# Patient Record
(demographics unavailable — no encounter records)

---

## 2017-04-12 NOTE — EDM.PDOC
ED HPI RENAL/





- General


Chief Complaint: Genitourinary Problem


Stated Complaint: UTI symptoms


Time Seen by Provider: 04/12/17 19:08


Source of Information: Reports: Patient


History Limitations: Reports: No limitations





- History of Present Illness


INITIAL COMMENTS - FREE TEXT/NARRATIVE: 





Burning, frequency of urination present for one week. Worsening. Some lower 

abdominal discomfort. No CVA pain.


Denies fevers/chills/nausea/emesis/bowel changes. No hematuria. 


No UTI since around 2014. 


No other complaints. 


Taking old Pyridium at home for burning, not much help. 





- Related Data


Allergies/ADRs: 


 Allergies











Allergy/AdvReac Type Severity Reaction Status Date / Time


 


acetaminophen Allergy  Dizziness Verified 04/12/17 18:57





[From Darvocet-N]     


 


coconut Allergy  Cannot Verified 04/12/17 18:57





   Remember  


 


codeine Allergy  Nausea and Verified 04/12/17 18:57





   Vomiting  


 


hydrocodone bitartrate Allergy  Nausea Verified 04/12/17 18:57





[From Vicodin]     


 


oxycodone HCl [From Percocet] Allergy  Dizziness Verified 04/12/17 18:57


 


propoxyphene napsylate Allergy  Dizziness Verified 04/12/17 18:57





[From Darvocet-N]     


 


Sulfa (Sulfonamide Allergy  Nausea and Verified 04/12/17 18:57





Antibiotics)   Vomiting  


 


steriods Allergy  Cannot Uncoded 04/12/17 18:57





   Remember  











Home Meds: 


 Home Meds





Ibuprofen [Advil] 200 mg PO Q6H PRN 02/28/14 [History]


Multivitamin [Multi-Vitamin Daily] 1 each PO DAILY 04/12/17 [History]


Nitrofurantoin Monohyd/M-Cryst [Macrobid 100 mg Capsule] 100 mg PO Q12HR #10 

capsule 04/12/17 [Rx]


Phenazopyridine HCl [Pyridium] 100 mg PO ASDIRECTED PRN #20 tablet 04/12/17 [Rx]











Past Medical History


Genitourinary History: Reports: UTI, recurrent





Social & Family History





- Tobacco Use


Smoking Status *Q: Current Every Day Smoker


Years of Tobacco use: 30


Second Hand Smoke Exposure: Yes





- Alcohol Use


Days Per Week of Alcohol Use: 1 (Occasionally)





- Recreational Drug Use


Recreational Drug Use: No





- Living Situation & Occupation


Living situation: Reports: with significant other (Fianc)


Occupation: employed (CNA, [employer])





ED ROS GENERAL





- Review of Systems


Review Of Systems: ROS reveals no pertinent complaints other than HPI.





ED EXAM, RENAL/





- Physical Exam


Exam: See Below


Exam Limited By: No limitations


General Appearance: alert, WD/WN, no apparent distress


Eye Exam: bilateral eye: EOMI, PERRL


Head: atraumatic, normocephalic


Neck: supple


Respiratory/Chest: no respiratory distress


GI/Abdominal: soft, no distention, tender (mild suprapubic tenderness with 

palpation).  No: guarding, rigid, rebound


Back Exam: No: CVA tenderness (L), CVA tenderness (R)


Neurological: alert, oriented, normal cognition, normal gait


Psychiatric: normal affect, normal mood


Skin Exam: Warm, Dry, Intact, Normal color





Course





- Vital Signs


Last Recorded V/S: 





 Last Vital Signs











Temp  36.6 C   04/12/17 18:53


 


Pulse  84   04/12/17 18:53


 


Resp  16   04/12/17 18:53


 


BP  134/90   04/12/17 18:53


 


Pulse Ox  100   04/12/17 18:53














- Orders/Labs/Meds


Orders: 





 Active Orders 24 hr











 Category Date Time Status


 


 CULTURE URINE [RM] Routine Lab  04/12/17 19:19 Uncollected


 


 CULTURE URINE [RM] Stat Lab  04/12/17 19:00 Received











Labs: 





 Laboratory Tests











  04/12/17 Range/Units





  19:00 


 


Specimen Type  Urincc  


 


Urine Color  Yellow  


 


Urine Appearance  Slightly cloudy  


 


Urine pH  6.0  (5.0-9.0)  


 


Ur Specific Gravity  <= 1.005  (1.005-1.030)  


 


Urine Protein  Negative  (NEGATIVE)  mg/dL


 


Urine Glucose (UA)  100 H  (NEGATIVE)  mg/dL


 


Urine Ketones  Negative  (NEGATIVE)  mg/dL


 


Urine Occult Blood  Moderate H  (NEGATIVE)  


 


Urine Nitrite  Negative  (NEGATIVE)  


 


Urine Bilirubin  Negative  (NEGATIVE)  


 


Urine Urobilinogen  0.2  (0.2-1.0)  E.U./dL


 


Ur Leukocyte Esterase  Moderate H  (NEGATIVE)  


 


Urine RBC  0-5  /HPF


 


Urine WBC  >100 H  /HPF


 


Ur Epithelial Cells  Few  /LPF


 


Urine Bacteria  Moderate H  (NONE TO FEW)  /HPF














- Re-Assessments/Exams


Free Text/Narrative Re-Assessment/Exam: 





04/12/17 19:28


+ua for UTI





Rx with Macrobid. Sent home with Pyridium. Given dose of Macrobid prior to 

discharge. 





Departure





- Departure


Time of Disposition: 19:20


Disposition: Home, Self-Care 01


Condition: good


Clinical Impression: 


 UTI, Urinary tract infectious disease





Prescriptions: 


Phenazopyridine HCl [Pyridium] 100 mg PO ASDIRECTED PRN #20 tablet


 PRN Reason: Dysuria


Instructions:  Urinary Tract Infection, Adult, Easy-to-Read


Forms:  ED Department Discharge


Additional Instructions: 


Follow up as needed if symptoms do not improve within 1-2 days. 


Recommend follow up urine check next Monday to make certain that infections has 

completely resolved. 





- My Orders


Last 24 Hours: 





My Active Orders





04/12/17 19:00


CULTURE URINE [RM] Stat 





04/12/17 19:19


CULTURE URINE [RM] Routine 














- Assessment/Plan


Last 24 Hours: 





My Active Orders





04/12/17 19:00


CULTURE URINE [RM] Stat 





04/12/17 19:19


CULTURE URINE [RM] Routine
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
